# Patient Record
Sex: FEMALE | Race: WHITE
[De-identification: names, ages, dates, MRNs, and addresses within clinical notes are randomized per-mention and may not be internally consistent; named-entity substitution may affect disease eponyms.]

---

## 2018-02-03 ENCOUNTER — HOSPITAL ENCOUNTER (EMERGENCY)
Dept: HOSPITAL 75 - ER | Age: 58
Discharge: HOME | End: 2018-02-03
Payer: COMMERCIAL

## 2018-02-03 VITALS — HEIGHT: 59 IN | WEIGHT: 152 LBS | BODY MASS INDEX: 30.64 KG/M2

## 2018-02-03 VITALS — DIASTOLIC BLOOD PRESSURE: 84 MMHG | SYSTOLIC BLOOD PRESSURE: 130 MMHG

## 2018-02-03 DIAGNOSIS — S52.571A: Primary | ICD-10-CM
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
## 2018-02-03 PROCEDURE — 73110 X-RAY EXAM OF WRIST: CPT

## 2018-02-03 PROCEDURE — 29125 APPL SHORT ARM SPLINT STATIC: CPT

## 2018-09-06 ENCOUNTER — HOSPITAL ENCOUNTER (OUTPATIENT)
Dept: HOSPITAL 75 - REHAB | Age: 58
Discharge: HOME | End: 2018-09-06
Attending: ORTHOPAEDIC SURGERY
Payer: COMMERCIAL

## 2018-09-06 DIAGNOSIS — S52.571A: Primary | ICD-10-CM
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
## 2018-10-16 ENCOUNTER — HOSPITAL ENCOUNTER (OUTPATIENT)
Dept: HOSPITAL 75 - OCC | Age: 58
Discharge: HOME | End: 2018-10-16
Attending: NURSE PRACTITIONER

## 2018-10-16 PROCEDURE — 73110 X-RAY EXAM OF WRIST: CPT

## 2018-10-16 NOTE — DIAGNOSTIC IMAGING REPORT
INDICATION: 

Fall, prior history of wrist fracture.



TIME OF EXAMINATION:  

2:15 PM.



COMPARISON: 

Correlation is made with prior wrist radiographs from 02/03/2018.



FINDINGS:

Three views of the right wrist demonstrate a volar plate and

numerous screws transfixing the distal radius. There is a lucency

through the distal radius which most likely represents a residual

fracture line from incomplete healing of the prior fracture. The

hardware is intact without fracture or loosening. The distal ulna

is intact. The carpus and metacarpals are intact.



IMPRESSION: 

Post operative changes of ORIF involving the distal radius

fracture. The fracture line does remain partly visible dorsally.

No other significant abnormality is seen.



Dictated by: 



  Dictated on workstation # [id number]

## 2019-05-15 ENCOUNTER — HOSPITAL ENCOUNTER (OUTPATIENT)
Dept: HOSPITAL 75 - CARD | Age: 59
End: 2019-05-15
Attending: INTERNAL MEDICINE
Payer: COMMERCIAL

## 2019-05-15 DIAGNOSIS — I35.0: ICD-10-CM

## 2019-05-15 DIAGNOSIS — R07.89: Primary | ICD-10-CM

## 2019-05-15 DIAGNOSIS — J44.9: ICD-10-CM

## 2019-05-15 DIAGNOSIS — E78.5: ICD-10-CM

## 2019-05-15 DIAGNOSIS — I35.1: ICD-10-CM

## 2019-05-15 PROCEDURE — 78452 HT MUSCLE IMAGE SPECT MULT: CPT

## 2019-05-15 PROCEDURE — 93017 CV STRESS TEST TRACING ONLY: CPT

## 2019-07-25 ENCOUNTER — HOSPITAL ENCOUNTER (OUTPATIENT)
Dept: HOSPITAL 75 - RAD | Age: 59
End: 2019-07-25
Attending: FAMILY MEDICINE
Payer: COMMERCIAL

## 2019-07-25 DIAGNOSIS — R07.9: Primary | ICD-10-CM

## 2019-07-25 PROCEDURE — 71046 X-RAY EXAM CHEST 2 VIEWS: CPT

## 2020-01-17 ENCOUNTER — HOSPITAL ENCOUNTER (OUTPATIENT)
Dept: HOSPITAL 75 - CARD | Age: 60
End: 2020-01-17
Attending: INTERNAL MEDICINE
Payer: COMMERCIAL

## 2020-01-17 DIAGNOSIS — I08.3: Primary | ICD-10-CM

## 2020-01-17 DIAGNOSIS — E78.5: ICD-10-CM

## 2020-01-17 PROCEDURE — 93306 TTE W/DOPPLER COMPLETE: CPT

## 2020-04-17 ENCOUNTER — HOSPITAL ENCOUNTER (OUTPATIENT)
Dept: HOSPITAL 75 - OCC | Age: 60
End: 2020-04-17
Attending: FAMILY MEDICINE

## 2020-04-17 DIAGNOSIS — M19.071: Primary | ICD-10-CM

## 2020-04-17 PROCEDURE — 73610 X-RAY EXAM OF ANKLE: CPT

## 2021-04-28 ENCOUNTER — HOSPITAL ENCOUNTER (OUTPATIENT)
Dept: HOSPITAL 75 - RAD | Age: 61
End: 2021-04-28
Attending: FAMILY MEDICINE
Payer: COMMERCIAL

## 2021-04-28 DIAGNOSIS — Z87.891: ICD-10-CM

## 2021-04-28 DIAGNOSIS — Z12.2: Primary | ICD-10-CM

## 2021-04-28 PROCEDURE — 71271 CT THORAX LUNG CANCER SCR C-: CPT

## 2022-01-28 ENCOUNTER — HOSPITAL ENCOUNTER (OUTPATIENT)
Dept: HOSPITAL 75 - CARD | Age: 62
End: 2022-01-28
Attending: PHYSICIAN ASSISTANT
Payer: COMMERCIAL

## 2022-01-28 DIAGNOSIS — I25.10: ICD-10-CM

## 2022-01-28 DIAGNOSIS — I08.0: Primary | ICD-10-CM

## 2022-01-28 DIAGNOSIS — I10: ICD-10-CM

## 2022-01-28 PROCEDURE — 93306 TTE W/DOPPLER COMPLETE: CPT

## 2022-02-06 ENCOUNTER — HOSPITAL ENCOUNTER (EMERGENCY)
Dept: HOSPITAL 75 - ER | Age: 62
Discharge: HOME | End: 2022-02-06
Payer: COMMERCIAL

## 2022-02-06 VITALS — HEIGHT: 59.06 IN | BODY MASS INDEX: 30.67 KG/M2 | WEIGHT: 152.12 LBS

## 2022-02-06 VITALS — SYSTOLIC BLOOD PRESSURE: 104 MMHG | DIASTOLIC BLOOD PRESSURE: 73 MMHG
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
## 2022-02-06 DIAGNOSIS — S16.1XXA: ICD-10-CM

## 2022-02-06 DIAGNOSIS — S06.0X0A: Primary | ICD-10-CM

## 2022-02-06 PROCEDURE — 70450 CT HEAD/BRAIN W/O DYE: CPT

## 2022-02-06 PROCEDURE — 73130 X-RAY EXAM OF HAND: CPT

## 2022-02-06 PROCEDURE — 73562 X-RAY EXAM OF KNEE 3: CPT

## 2022-02-06 PROCEDURE — 72100 X-RAY EXAM L-S SPINE 2/3 VWS: CPT

## 2022-02-06 PROCEDURE — 72125 CT NECK SPINE W/O DYE: CPT

## 2022-03-31 ENCOUNTER — HOSPITAL ENCOUNTER (OUTPATIENT)
Dept: HOSPITAL 75 - REHAB | Age: 62
Discharge: HOME | End: 2022-03-31
Attending: FAMILY MEDICINE
Payer: COMMERCIAL

## 2022-03-31 DIAGNOSIS — M54.50: Primary | ICD-10-CM
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
## 2022-03-31 DIAGNOSIS — M79.604: ICD-10-CM

## 2022-03-31 DIAGNOSIS — M79.605: ICD-10-CM

## 2022-03-31 PROCEDURE — 97163 PT EVAL HIGH COMPLEX 45 MIN: CPT

## 2022-04-28 ENCOUNTER — HOSPITAL ENCOUNTER (OUTPATIENT)
Dept: HOSPITAL 75 - REHAB | Age: 62
LOS: 2 days | Discharge: HOME | End: 2022-04-30
Attending: FAMILY MEDICINE
Payer: COMMERCIAL
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
## 2022-04-28 DIAGNOSIS — M79.604: ICD-10-CM

## 2022-04-28 DIAGNOSIS — M79.605: ICD-10-CM

## 2022-04-28 DIAGNOSIS — M54.50: Primary | ICD-10-CM

## 2022-04-28 PROCEDURE — 97140 MANUAL THERAPY 1/> REGIONS: CPT

## 2022-04-28 PROCEDURE — 97110 THERAPEUTIC EXERCISES: CPT

## 2022-05-25 ENCOUNTER — HOSPITAL ENCOUNTER (OUTPATIENT)
Dept: HOSPITAL 75 - RAD | Age: 62
End: 2022-05-25
Attending: FAMILY MEDICINE
Payer: COMMERCIAL

## 2022-05-25 DIAGNOSIS — M51.27: ICD-10-CM

## 2022-05-25 DIAGNOSIS — M48.061: ICD-10-CM

## 2022-05-25 DIAGNOSIS — M47.816: Primary | ICD-10-CM

## 2022-05-25 DIAGNOSIS — M51.36: ICD-10-CM

## 2022-05-25 DIAGNOSIS — M47.817: ICD-10-CM

## 2022-05-25 DIAGNOSIS — M48.07: ICD-10-CM

## 2022-05-25 DIAGNOSIS — M51.37: ICD-10-CM

## 2022-05-25 DIAGNOSIS — M24.28: ICD-10-CM

## 2022-05-25 DIAGNOSIS — M51.26: ICD-10-CM

## 2022-05-25 PROCEDURE — 72148 MRI LUMBAR SPINE W/O DYE: CPT

## 2022-12-02 ENCOUNTER — HOSPITAL ENCOUNTER (OUTPATIENT)
Dept: HOSPITAL 75 - CARD | Age: 62
End: 2022-12-02
Attending: INTERNAL MEDICINE
Payer: COMMERCIAL

## 2022-12-02 DIAGNOSIS — I25.10: ICD-10-CM

## 2022-12-02 DIAGNOSIS — I35.1: ICD-10-CM

## 2022-12-02 DIAGNOSIS — I11.9: Primary | ICD-10-CM

## 2022-12-02 PROCEDURE — 93306 TTE W/DOPPLER COMPLETE: CPT

## 2023-03-01 ENCOUNTER — HOSPITAL ENCOUNTER (OUTPATIENT)
Dept: HOSPITAL 75 - CARD | Age: 63
End: 2023-03-01
Attending: PHYSICIAN ASSISTANT
Payer: COMMERCIAL

## 2023-03-01 VITALS — SYSTOLIC BLOOD PRESSURE: 130 MMHG | DIASTOLIC BLOOD PRESSURE: 71 MMHG

## 2023-03-01 DIAGNOSIS — I35.0: Primary | ICD-10-CM

## 2023-03-01 DIAGNOSIS — I25.10: ICD-10-CM

## 2023-03-01 DIAGNOSIS — I10: ICD-10-CM

## 2023-03-01 PROCEDURE — 93351 STRESS TTE COMPLETE: CPT

## 2023-05-01 ENCOUNTER — HOSPITAL ENCOUNTER (OUTPATIENT)
Dept: HOSPITAL 75 - RAD | Age: 63
End: 2023-05-01
Attending: NURSE PRACTITIONER
Payer: COMMERCIAL

## 2023-05-01 DIAGNOSIS — M17.11: ICD-10-CM

## 2023-05-01 DIAGNOSIS — M25.461: ICD-10-CM

## 2023-05-01 DIAGNOSIS — S83.8X1A: ICD-10-CM

## 2023-05-01 DIAGNOSIS — E03.9: ICD-10-CM

## 2023-05-01 DIAGNOSIS — Z04.2: Primary | ICD-10-CM

## 2023-05-01 DIAGNOSIS — M62.81: ICD-10-CM

## 2023-05-01 DIAGNOSIS — I51.9: ICD-10-CM

## 2023-05-01 DIAGNOSIS — M94.8X6: ICD-10-CM

## 2023-05-01 PROCEDURE — 73721 MRI JNT OF LWR EXTRE W/O DYE: CPT

## 2023-11-13 ENCOUNTER — HOSPITAL ENCOUNTER (OUTPATIENT)
Dept: HOSPITAL 75 - CATH | Age: 63
LOS: 1 days | Discharge: HOME | End: 2023-11-14
Attending: INTERNAL MEDICINE
Payer: COMMERCIAL

## 2023-11-13 VITALS — DIASTOLIC BLOOD PRESSURE: 77 MMHG | SYSTOLIC BLOOD PRESSURE: 140 MMHG

## 2023-11-13 VITALS — SYSTOLIC BLOOD PRESSURE: 116 MMHG | DIASTOLIC BLOOD PRESSURE: 70 MMHG

## 2023-11-13 VITALS — DIASTOLIC BLOOD PRESSURE: 59 MMHG | SYSTOLIC BLOOD PRESSURE: 127 MMHG

## 2023-11-13 VITALS — SYSTOLIC BLOOD PRESSURE: 104 MMHG | DIASTOLIC BLOOD PRESSURE: 46 MMHG

## 2023-11-13 VITALS — BODY MASS INDEX: 29.17 KG/M2 | HEIGHT: 59.49 IN | WEIGHT: 146.61 LBS

## 2023-11-13 VITALS — SYSTOLIC BLOOD PRESSURE: 121 MMHG | DIASTOLIC BLOOD PRESSURE: 59 MMHG

## 2023-11-13 VITALS — DIASTOLIC BLOOD PRESSURE: 62 MMHG | SYSTOLIC BLOOD PRESSURE: 124 MMHG

## 2023-11-13 VITALS — SYSTOLIC BLOOD PRESSURE: 153 MMHG | DIASTOLIC BLOOD PRESSURE: 69 MMHG

## 2023-11-13 VITALS — SYSTOLIC BLOOD PRESSURE: 136 MMHG | DIASTOLIC BLOOD PRESSURE: 70 MMHG

## 2023-11-13 VITALS — SYSTOLIC BLOOD PRESSURE: 123 MMHG | DIASTOLIC BLOOD PRESSURE: 90 MMHG

## 2023-11-13 VITALS — SYSTOLIC BLOOD PRESSURE: 127 MMHG | DIASTOLIC BLOOD PRESSURE: 58 MMHG

## 2023-11-13 VITALS — DIASTOLIC BLOOD PRESSURE: 72 MMHG | SYSTOLIC BLOOD PRESSURE: 148 MMHG

## 2023-11-13 VITALS — DIASTOLIC BLOOD PRESSURE: 60 MMHG | SYSTOLIC BLOOD PRESSURE: 130 MMHG

## 2023-11-13 VITALS — DIASTOLIC BLOOD PRESSURE: 58 MMHG | SYSTOLIC BLOOD PRESSURE: 110 MMHG

## 2023-11-13 VITALS — DIASTOLIC BLOOD PRESSURE: 66 MMHG | SYSTOLIC BLOOD PRESSURE: 127 MMHG

## 2023-11-13 VITALS — DIASTOLIC BLOOD PRESSURE: 71 MMHG | SYSTOLIC BLOOD PRESSURE: 137 MMHG

## 2023-11-13 VITALS — DIASTOLIC BLOOD PRESSURE: 56 MMHG | SYSTOLIC BLOOD PRESSURE: 121 MMHG

## 2023-11-13 VITALS — DIASTOLIC BLOOD PRESSURE: 61 MMHG | SYSTOLIC BLOOD PRESSURE: 132 MMHG

## 2023-11-13 VITALS — DIASTOLIC BLOOD PRESSURE: 70 MMHG | SYSTOLIC BLOOD PRESSURE: 127 MMHG

## 2023-11-13 DIAGNOSIS — I25.10: Primary | ICD-10-CM

## 2023-11-13 DIAGNOSIS — J44.9: ICD-10-CM

## 2023-11-13 DIAGNOSIS — E78.2: ICD-10-CM

## 2023-11-13 DIAGNOSIS — E03.9: ICD-10-CM

## 2023-11-13 DIAGNOSIS — I65.29: ICD-10-CM

## 2023-11-13 DIAGNOSIS — I08.3: ICD-10-CM

## 2023-11-13 DIAGNOSIS — I10: ICD-10-CM

## 2023-11-13 DIAGNOSIS — Z79.899: ICD-10-CM

## 2023-11-13 DIAGNOSIS — F17.210: ICD-10-CM

## 2023-11-13 LAB
ALBUMIN SERPL-MCNC: 3.9 GM/DL (ref 3.2–4.5)
ALP SERPL-CCNC: 113 U/L (ref 40–136)
ALT SERPL-CCNC: 14 U/L (ref 0–55)
APTT BLD: 34 SEC (ref 24–35)
BILIRUB SERPL-MCNC: 0.2 MG/DL (ref 0.1–1)
BUN/CREAT SERPL: 20
CALCIUM SERPL-MCNC: 8.7 MG/DL (ref 8.5–10.1)
CHLORIDE SERPL-SCNC: 109 MMOL/L (ref 98–107)
CHOLEST SERPL-MCNC: 240 MG/DL (ref ?–200)
CO2 SERPL-SCNC: 26 MMOL/L (ref 21–32)
CREAT SERPL-MCNC: 0.69 MG/DL (ref 0.6–1.3)
GFR SERPLBLD BASED ON 1.73 SQ M-ARVRAT: 97 ML/MIN
GLUCOSE SERPL-MCNC: 90 MG/DL (ref 70–105)
HCT VFR BLD CALC: 42 % (ref 35–52)
HDLC SERPL-MCNC: 57 MG/DL (ref 40–60)
HGB BLD-MCNC: 13.7 G/DL (ref 11.5–16)
INR PPP: 0.8 (ref 0.8–1.4)
MCH RBC QN AUTO: 31 PG (ref 25–34)
MCHC RBC AUTO-ENTMCNC: 33 G/DL (ref 32–36)
MCV RBC AUTO: 96 FL (ref 80–99)
PLATELET # BLD: 333 10^3/UL (ref 130–400)
PMV BLD AUTO: 10.3 FL (ref 9–12.2)
POTASSIUM SERPL-SCNC: 3.9 MMOL/L (ref 3.6–5)
PROT SERPL-MCNC: 6.8 GM/DL (ref 6.4–8.2)
PROTHROMBIN TIME: 11.8 SEC (ref 12.2–14.7)
SODIUM SERPL-SCNC: 143 MMOL/L (ref 135–145)
TRIGL SERPL-MCNC: 84 MG/DL (ref ?–150)
VLDLC SERPL CALC-MCNC: 17 MG/DL (ref 5–40)
WBC # BLD AUTO: 6.9 10^3/UL (ref 4.3–11)

## 2023-11-13 PROCEDURE — 85610 PROTHROMBIN TIME: CPT

## 2023-11-13 PROCEDURE — 85347 COAGULATION TIME ACTIVATED: CPT

## 2023-11-13 PROCEDURE — 93005 ELECTROCARDIOGRAM TRACING: CPT

## 2023-11-13 PROCEDURE — 36415 COLL VENOUS BLD VENIPUNCTURE: CPT

## 2023-11-13 PROCEDURE — 93306 TTE W/DOPPLER COMPLETE: CPT

## 2023-11-13 PROCEDURE — 85027 COMPLETE CBC AUTOMATED: CPT

## 2023-11-13 PROCEDURE — 80048 BASIC METABOLIC PNL TOTAL CA: CPT

## 2023-11-13 PROCEDURE — 93454 CORONARY ARTERY ANGIO S&I: CPT

## 2023-11-13 PROCEDURE — 71045 X-RAY EXAM CHEST 1 VIEW: CPT

## 2023-11-13 PROCEDURE — 80053 COMPREHEN METABOLIC PANEL: CPT

## 2023-11-13 PROCEDURE — 87081 CULTURE SCREEN ONLY: CPT

## 2023-11-13 PROCEDURE — 80061 LIPID PANEL: CPT

## 2023-11-13 PROCEDURE — 85730 THROMBOPLASTIN TIME PARTIAL: CPT

## 2023-11-13 RX ADMIN — SODIUM CHLORIDE SCH MLS/HR: 900 INJECTION, SOLUTION INTRAVENOUS at 20:19

## 2023-11-13 RX ADMIN — SODIUM CHLORIDE SCH MLS/HR: 900 INJECTION, SOLUTION INTRAVENOUS at 10:01

## 2023-11-13 RX ADMIN — GABAPENTIN SCH MG: 300 CAPSULE ORAL at 20:17

## 2023-11-14 VITALS — DIASTOLIC BLOOD PRESSURE: 66 MMHG | SYSTOLIC BLOOD PRESSURE: 140 MMHG

## 2023-11-14 VITALS — SYSTOLIC BLOOD PRESSURE: 128 MMHG | DIASTOLIC BLOOD PRESSURE: 63 MMHG

## 2023-11-14 VITALS — DIASTOLIC BLOOD PRESSURE: 69 MMHG | SYSTOLIC BLOOD PRESSURE: 126 MMHG

## 2023-11-14 VITALS — SYSTOLIC BLOOD PRESSURE: 113 MMHG | DIASTOLIC BLOOD PRESSURE: 62 MMHG

## 2023-11-14 VITALS — SYSTOLIC BLOOD PRESSURE: 137 MMHG | DIASTOLIC BLOOD PRESSURE: 70 MMHG

## 2023-11-14 LAB
BUN/CREAT SERPL: 22
CALCIUM SERPL-MCNC: 8.1 MG/DL (ref 8.5–10.1)
CHLORIDE SERPL-SCNC: 111 MMOL/L (ref 98–107)
CO2 SERPL-SCNC: 23 MMOL/L (ref 21–32)
CREAT SERPL-MCNC: 0.67 MG/DL (ref 0.6–1.3)
GFR SERPLBLD BASED ON 1.73 SQ M-ARVRAT: 98 ML/MIN
GLUCOSE SERPL-MCNC: 96 MG/DL (ref 70–105)
HCT VFR BLD CALC: 37 % (ref 35–52)
HGB BLD-MCNC: 11.9 G/DL (ref 11.5–16)
MCH RBC QN AUTO: 31 PG (ref 25–34)
MCHC RBC AUTO-ENTMCNC: 33 G/DL (ref 32–36)
MCV RBC AUTO: 96 FL (ref 80–99)
PLATELET # BLD: 285 10^3/UL (ref 130–400)
PMV BLD AUTO: 10.6 FL (ref 9–12.2)
POTASSIUM SERPL-SCNC: 3.9 MMOL/L (ref 3.6–5)
SODIUM SERPL-SCNC: 140 MMOL/L (ref 135–145)
WBC # BLD AUTO: 6.8 10^3/UL (ref 4.3–11)

## 2023-11-14 RX ADMIN — GABAPENTIN SCH MG: 300 CAPSULE ORAL at 08:33

## 2023-11-14 RX ADMIN — SODIUM CHLORIDE SCH MLS/HR: 900 INJECTION, SOLUTION INTRAVENOUS at 05:55
